# Patient Record
Sex: FEMALE | Race: WHITE | NOT HISPANIC OR LATINO | URBAN - METROPOLITAN AREA
[De-identification: names, ages, dates, MRNs, and addresses within clinical notes are randomized per-mention and may not be internally consistent; named-entity substitution may affect disease eponyms.]

---

## 2019-01-26 ENCOUNTER — HOSPITAL ENCOUNTER (OUTPATIENT)
Dept: URGENT CARE | Facility: CLINIC | Age: 29
Discharge: HOME | End: 2019-01-26

## 2019-01-31 ENCOUNTER — HOSPITAL ENCOUNTER (OUTPATIENT)
Dept: PERIOP | Facility: HOSPITAL | Age: 29
Setting detail: HOSPITAL OUTPATIENT SURGERY
Discharge: HOME | End: 2019-01-31
Attending: OBSTETRICS & GYNECOLOGY

## 2019-01-31 LAB
ABO GROUP BLD: NORMAL
BASOPHILS # BLD AUTO: 0.03 10*3/UL (ref 0–0.2)
BASOPHILS NFR BLD AUTO: 0.8 % (ref 0–3)
BLD GP AB SCN SERPL QL: NORMAL
CONV ABD CONTROL: NORMAL
EOSINOPHIL # BLD AUTO: 0.08 10*3/UL (ref 0–0.7)
EOSINOPHIL # BLD AUTO: 1.94 % (ref 0–7)
ERYTHROCYTE [DISTWIDTH] IN BLOOD BY AUTOMATED COUNT: 16 % (ref 11.5–14.5)
HBA1C MFR BLD: 11.4 G/DL (ref 12–16)
HCG INTACT+B SERPL-ACNC: <0.5 M[IU]/ML (ref 0–5)
HCT VFR BLD AUTO: 33.4 % (ref 37–47)
LYMPHOCYTES # BLD AUTO: 1.16 10*3/UL (ref 1–5)
MCH RBC QN AUTO: 26.3 PG (ref 27–31)
MCHC RBC AUTO-ENTMCNC: 34.1 G/DL (ref 33–37)
MCV RBC AUTO: 77.1 FL (ref 81–99)
MONOCYTES # BLD AUTO: 0.47 10*3/UL (ref 0.2–1.2)
MONOCYTES NFR BLD AUTO: 11.6 % (ref 3–10)
NEUTROPHILS # BLD AUTO: 2.26 10*3/UL (ref 2–8)
NEUTROPHILS NFR BLD AUTO: 56.5 % (ref 30–85)
NRBC BLD AUTO-RTO: 0 % (ref 0–0.01)
PLATELET # BLD AUTO: 267 10*3/UL (ref 130–400)
PMV BLD AUTO: 8.3 FL (ref 7.4–10.4)
RBC # BLD AUTO: 4.33 10*6/UL (ref 4.2–5.4)
RH BLD: NORMAL
VARIANT LYMPHS NFR BLD MANUAL: 29.1 % (ref 20–45)
WBC # BLD AUTO: 4 10*3/UL (ref 4.8–10.8)

## 2025-03-17 ENCOUNTER — TELEPHONE (OUTPATIENT)
Dept: GASTROENTEROLOGY | Facility: CLINIC | Age: 35
End: 2025-03-17
Payer: COMMERCIAL

## 2025-03-17 NOTE — TELEPHONE ENCOUNTER
Spoke with Ann to let her know that we need to know which family member has had colon cancer, because unless it is an immediate family member, the insurance will not cover. She is going to silvestre the provider. Will fax over the records to let us know. Deferring referring for two days to wait on the records.

## 2025-03-21 NOTE — TELEPHONE ENCOUNTER
Spoke with patient regarding a referring we received for a colon screening.Patient stated that her mother/grandmother has had colon cancer. She has schedule a screening appointment on 5/14/25@3:00. I offered her a sooner appointment with another provider. She declined.

## 2025-04-24 ENCOUNTER — OFFICE VISIT (OUTPATIENT)
Dept: OBSTETRICS AND GYNECOLOGY | Age: 35
End: 2025-04-24
Payer: COMMERCIAL

## 2025-04-24 VITALS
HEIGHT: 63 IN | WEIGHT: 138 LBS | BODY MASS INDEX: 24.45 KG/M2 | SYSTOLIC BLOOD PRESSURE: 112 MMHG | HEART RATE: 88 BPM | DIASTOLIC BLOOD PRESSURE: 75 MMHG

## 2025-04-24 DIAGNOSIS — Z01.419 ENCOUNTER FOR GYNECOLOGICAL EXAMINATION WITHOUT ABNORMAL FINDING: Primary | ICD-10-CM

## 2025-04-24 DIAGNOSIS — Z11.3 SCREEN FOR STD (SEXUALLY TRANSMITTED DISEASE): ICD-10-CM

## 2025-04-24 PROCEDURE — 87661 TRICHOMONAS VAGINALIS AMPLIF: CPT | Performed by: OBSTETRICS & GYNECOLOGY

## 2025-04-24 PROCEDURE — G0123 SCREEN CERV/VAG THIN LAYER: HCPCS | Performed by: OBSTETRICS & GYNECOLOGY

## 2025-04-24 PROCEDURE — 87491 CHLMYD TRACH DNA AMP PROBE: CPT | Performed by: OBSTETRICS & GYNECOLOGY

## 2025-04-24 PROCEDURE — 87591 N.GONORRHOEAE DNA AMP PROB: CPT | Performed by: OBSTETRICS & GYNECOLOGY

## 2025-04-24 PROCEDURE — 87624 HPV HI-RISK TYP POOLED RSLT: CPT | Performed by: OBSTETRICS & GYNECOLOGY

## 2025-04-24 RX ORDER — FERROUS SULFATE 325(65) MG
1 TABLET ORAL DAILY
COMMUNITY
Start: 2025-03-12

## 2025-04-24 RX ORDER — ERGOCALCIFEROL 1.25 MG/1
CAPSULE, LIQUID FILLED ORAL
COMMUNITY
Start: 2025-03-11

## 2025-04-24 NOTE — PROGRESS NOTES
"Well Woman Visit    CC: Annual well woman exam       HPI:   34 y.o. Contraception or HRT: Contraception:  Tubal ligation  Menses:   q mon, lasts 5-6 days, changes products q 4hrs on heaviest days.   Pain:  None  Incontinence concerns: No  Hx of abnormal pap:  No  Pt has no complaints today. Desires std swab. Denies sx.       History: PMHx, Meds, Allergies, PSHx, Social Hx, and POBHx all reviewed and updated.      PHYSICAL EXAM:  /75   Pulse 88   Ht 160 cm (63\")   Wt 62.6 kg (138 lb)   LMP 2025   BMI 24.45 kg/m²   General- NAD, alert and oriented, appropriate  Psych- Normal mood, good memory  Neck- No masses, no thyroid enlargement  CV- Regular rhythm, no murnurs  Resp- CTA to bases, no wheezes  Abdomen- Soft, non distended, non tender, no masses    Breast left-  Bilaterally symmetrical, no masses, non tender, no nipple discharge  Breast right- Bilaterally symmetrical, no masses, non tender, no nipple discharge    External genitalia- Normal female, no lesions  Urethra/meatus- Normal, no masses, non tender, no prolapse  Bladder- Normal, no masses, non tender, no prolapse  Vagina- Normal, no atrophy, no lesions, no discharge, no prolapse, on menses  Cvx- Normal, no lesions, no discharge, No cervical motion tenderness  Uterus- Normal size, shape & consistency.  Non tender, mobile.  Adnexa- No mass, non tender  Anus/Rectum/Perineum- Not performed    Lymphatic- No palpable neck, axillary, or groin nodes  Ext- No edema, no cyanosis    Skin- No lesions, no rashes, no acanthosis nigricans        ASSESSMENT and PLAN:  WWE    Diagnoses and all orders for this visit:    1. Encounter for gynecological examination without abnormal finding (Primary)  -     IGP,CtNgTv,Apt HPV    2. Screen for STD (sexually transmitted disease)  -     IGP,CtNgTv,Apt HPV        Counseling:     Track menses, RTO IF <q21d, >7d long, or heavy    Domestic violence/abuse screen: negative    Depression screen: no " SI    Preventative:   BREAST HEALTH- Monthly self breast exam importance and how to reviewed. MMG and/or MRI (prn) reviewed per society guidelines and her individual history. Mammo/MRI screen: Not medically needed.  CERVICAL CANCER Screening- Reviewed current ASCCP guidelines for screening w and wo cotest HPV, age specific.  Screen: Updated today.  COLON CANCER Screening- Reviewed current medical society guidelines and options.  Colonoscopy screen:  Not medically needed.  SEXUAL HEALTH: STD screening desired.  Ordered.  VACCINATIONS Recommended: Flu annually, Gardisil/HPV vaccine 9-27yo, up to 44yo.  Importance discussed, risk being unvaccinated reviewed.  Questions answered  SMOKING STATUS- pt does use nicotine and/or tobacco.  I reviewed importance of avoiding.  Options to quit offered per [employer] protocols.  Recommend FU w PCP regarding quitting options if unable to quit wo assistance.  [facility] : does not qualify.  Gardasil status: declined.       She understands the importance of having any ordered tests to be performed in a timely fashion.  She is encouraged to review her results online and/or contact or office if she has questions.     Follow Up:  Return in about 1 year (around 4/24/2026) for Annual physical.      Danna Mayorga, APRN  04/24/2025

## 2025-04-29 LAB
C TRACH RRNA CVX QL NAA+PROBE: NEGATIVE
CYTOLOGIST CVX/VAG CYTO: NORMAL
CYTOLOGY CVX/VAG DOC CYTO: NORMAL
CYTOLOGY CVX/VAG DOC THIN PREP: NORMAL
DX ICD CODE: NORMAL
HPV I/H RISK 4 DNA CVX QL PROBE+SIG AMP: NEGATIVE
N GONORRHOEA RRNA CVX QL NAA+PROBE: NEGATIVE
OTHER STN SPEC: NORMAL
SERVICE CMNT-IMP: NORMAL
STAT OF ADQ CVX/VAG CYTO-IMP: NORMAL
T VAGINALIS RRNA SPEC QL NAA+PROBE: NEGATIVE

## 2025-04-30 ENCOUNTER — TELEPHONE (OUTPATIENT)
Dept: OBSTETRICS AND GYNECOLOGY | Age: 35
End: 2025-04-30
Payer: COMMERCIAL

## 2025-04-30 NOTE — TELEPHONE ENCOUNTER
Caller: Veronica Patel    Relationship: Self    Best call back number: [phone]      Caller requesting test results: PT     What test was performed: PAP     When was the test performed: 4/24/25     Where was the test performed: OFFICE     Additional notes: PT WOULD LIKE TO HAVE SOMEONE CALL HER TO GO OVER PAP RESULTS

## 2025-04-30 NOTE — TELEPHONE ENCOUNTER
Pt stated the [facility] told her that we will always call on every lab result.  I informed her that we do not call on regular screenings if the result is normal and/or negative and that no news is good news.  I went over the result and pt is aware her pap result came back normal.  Pt voiced understanding.

## 2025-05-14 ENCOUNTER — TELEPHONE (OUTPATIENT)
Dept: GASTROENTEROLOGY | Facility: CLINIC | Age: 35
End: 2025-05-14
Payer: COMMERCIAL

## 2025-05-14 NOTE — TELEPHONE ENCOUNTER
During the Direct Access call with Veronica Patel, 1990,  the patient communicated that they are having other gastrointestinal symptoms, to include numbness/pain in intestinal area that will require an office visit. The patient verbalized understanding, the call was stopped and the patient was scheduled for an office visit with ADRIANNE Ignacio on 05/19/2025 @ 2 pm.

## 2025-05-19 ENCOUNTER — OFFICE VISIT (OUTPATIENT)
Dept: GASTROENTEROLOGY | Facility: CLINIC | Age: 35
End: 2025-05-19
Payer: COMMERCIAL

## 2025-05-19 VITALS
HEIGHT: 63 IN | SYSTOLIC BLOOD PRESSURE: 130 MMHG | HEART RATE: 87 BPM | DIASTOLIC BLOOD PRESSURE: 84 MMHG | BODY MASS INDEX: 24.63 KG/M2 | WEIGHT: 139 LBS

## 2025-05-19 DIAGNOSIS — Z80.0 FAMILY HISTORY OF COLON CANCER IN MOTHER: ICD-10-CM

## 2025-05-19 DIAGNOSIS — R14.0 ABDOMINAL BLOATING: Primary | ICD-10-CM

## 2025-05-19 DIAGNOSIS — R20.0 NUMBNESS: ICD-10-CM

## 2025-05-19 RX ORDER — SODIUM, POTASSIUM,MAG SULFATES 17.5-3.13G
2 SOLUTION, RECONSTITUTED, ORAL ORAL ONCE
Qty: 354 ML | Refills: 0 | Status: SHIPPED | OUTPATIENT
Start: 2025-05-19 | End: 2025-05-19

## 2025-05-19 NOTE — PROGRESS NOTES
Chief Complaint     Abdominal Pain and FHx Colon Cancer    Patient or patient representative verbalized consent for the use of Ambient Listening during the visit with  ADRIANNE Jules for chart documentation. 5/19/2025  14:30 EDT    History of Present Illness     History of Present Illness  The patient presents for evaluation of abdominal pain and a family history of colon cancer.    She underwent liposuction in her abdominal region 2 years ago.  Recently, she has been experiencing fluid retention and bloating, particularly when wearing tight clothing around her waist. She also reports a new sensation of numbness in the left lower quadrant of her abdomen since 01/2025, which intensifies during bowel movements. This numbness is reminiscent of the post-liposuction numbness she experienced throughout her abdomen. Her bowel movements are regular, occurring every other day, with no reported presence of blood in her stool. She has never undergone a colonoscopy. She also mentions that she experiences diarrhea if she skips any of her three daily meals.    She experienced an unusual chest pain in 01/2025, described as rib-like rather than cardiac in nature, which radiated to the center of her chest. This pain persisted for approximately 2.5 to 3 months before resolving. She does not report any symptoms of heartburn, reflux, nausea, or vomiting.    PAST SURGICAL HISTORY:  Liposuction (abdominal region) - 05/2023    FAMILY HISTORY  - Mother: Colon cancer, diagnosed at age 49, still living  - Grandfather: Colon cancer, age of diagnosis unknown         History      History reviewed. No pertinent past medical history.    Past Surgical History:   Procedure Laterality Date    TUBAL ABDOMINAL LIGATION         Family History   Problem Relation Age of Onset    Colon cancer Mother     Colon cancer Maternal Grandfather     Breast cancer Neg Hx     Ovarian cancer Neg Hx     Uterine cancer Neg Hx     Melanoma Neg Hx      "    Current Medications        Current Outpatient Medications:     FeroSul 325 (65 Fe) MG tablet, Take 1 tablet by mouth Daily., Disp: , Rfl:     vitamin D (ERGOCALCIFEROL) 1.25 MG (78518 UT) capsule capsule, , Disp: , Rfl:     sodium-potassium-magnesium sulfates (SUPREP) 17.5-3.13-1.6 GM/177ML solution oral solution, Take 2 bottles by mouth 1 (One) Time for 1 dose. Take as directed, Disp: 354 mL, Rfl: 0     Allergies     Allergies   Allergen Reactions    Penicillins Unknown - High Severity       Social History       Social History     Social History Narrative    Not on file       Immunizations     Immunization:  Immunization History   Administered Date(s) Administered    COVID-19 ([employer]) 11/18/2021          Objective     Objective     Vital Signs:   /84 (BP Location: Left arm, Patient Position: Sitting, Cuff Size: Adult)   Pulse 87   Ht 160 cm (62.99\")   Wt 63 kg (139 lb)   BMI 24.63 kg/m²       Physical Exam    Results      Result Review :               Results             Assessment and Plan        Assessment and Plan    Diagnoses and all orders for this visit:    1. Abdominal bloating (Primary)  -     Case Request; Standing  -     Case Request    2. Numbness  Comments:  abdomen LLQ  Orders:  -     Case Request; Standing  -     Case Request    3. Family history of colon cancer in mother  -     Case Request; Standing  -     Case Request    Other orders  -     Follow Anesthesia Guidelines / Protocol; Standing  -     Follow Anesthesia Guidelines / Protocol; Future  -     sodium-potassium-magnesium sulfates (SUPREP) 17.5-3.13-1.6 GM/177ML solution oral solution; Take 2 bottles by mouth 1 (One) Time for 1 dose. Take as directed  Dispense: 354 mL; Refill: 0  -     Verify NPO; Standing  -     Verify Bowel Prep Was Successful; Standing  -     Give Tap Water Enema If Bowel Prep Insufficient; Standing  -     Obtain Informed Consent; Standing        Assessment & Plan  1. Abdominal pain:  - Sensation of numbness " in the left lower quadrant of the abdomen, worsening with bowel movements.  - Numbness present since 01/2025, not associated with pain.  - Possible nerve damage related to liposuction performed 2 years ago.  - Schedule colonoscopy to rule out underlying issues.  - Consider CT scan if colonoscopy results are unremarkable and numbness persists.    2. Family history of colon cancer:  - Mother diagnosed with colon cancer at age 49.  - Grandfather also had colon cancer.  - Schedule colonoscopy for screening.  - Provide instructions on preparation: clear liquid diet and medication to cleanse the colon the day before the procedure.      COLONOSCOPY (N/A)Surgical Risk and Benefits discussed: Possible risks/complications, benefits, and alternatives to surgical or invasive procedure have been explained to patient and/or legal guardian; risks include bleeding, infection, and perforation. Patient has been evaluated and can tolerate anesthesia and/or sedation. Risks, benefits, and alternatives to anesthesia and sedation have been explained to patient and/or legal guardian.       Follow Up        Follow Up   Return in about 7 months (around 12/19/2025) for abdominal bloating .  Patient was given instructions and counseling regarding her condition or for health maintenance advice. Please see specific information pulled into the AVS if appropriate.

## 2025-05-20 ENCOUNTER — TELEPHONE (OUTPATIENT)
Dept: GASTROENTEROLOGY | Facility: CLINIC | Age: 35
End: 2025-05-20
Payer: COMMERCIAL

## 2025-05-20 NOTE — TELEPHONE ENCOUNTER
ENDO RECONCILIATION  Verify source of procedure(s): Office visit  If other, please list source: 5/19/25    TIME OUT-CONFIRM CORRECT PROCEDURE: Colonoscopy  Cardiology:  Pulmonology:  Blood thinner:   GLP-1:  Additional DX/indication for procedure:     Please include any other notes relevant to endo reconciliation: N/A

## 2025-05-21 ENCOUNTER — ANESTHESIA EVENT (OUTPATIENT)
Dept: GASTROENTEROLOGY | Facility: HOSPITAL | Age: 35
End: 2025-05-21
Payer: COMMERCIAL

## 2025-05-21 RX ORDER — SODIUM CHLORIDE, SODIUM LACTATE, POTASSIUM CHLORIDE, CALCIUM CHLORIDE 600; 310; 30; 20 MG/100ML; MG/100ML; MG/100ML; MG/100ML
30 INJECTION, SOLUTION INTRAVENOUS CONTINUOUS
Status: CANCELLED | OUTPATIENT
Start: 2025-05-21 | End: 2025-05-22

## 2025-05-21 NOTE — ANESTHESIA PREPROCEDURE EVALUATION
Anesthesia Evaluation     Patient summary reviewed and Nursing notes reviewed   NPO Solid Status: > 8 hours  NPO Liquid Status: > 4 hours           Airway   Mallampati: I  TM distance: >3 FB  No difficulty expected  Dental - normal exam     Pulmonary - normal exam   (+) a smoker vape,  Cardiovascular - negative cardio ROS and normal exam  Exercise tolerance: good (4-7 METS)        Neuro/Psych  (+) numbness  GI/Hepatic/Renal/Endo - negative ROS     Musculoskeletal (-) negative ROS    Abdominal  - normal exam   Substance History - negative use     OB/GYN          Other        ROS/Med Hx Other: S/p tubal       Phys Exam Other: Tongue ring removed.               Anesthesia Plan    ASA 2     general   total IV anesthesia  (Total IV Anesthesia    Patient understands anesthesia not responsible for dental damage.  )  intravenous induction     Anesthetic plan, risks, benefits, and alternatives have been provided, discussed and informed consent has been obtained with: patient.    Plan discussed with CRNA.    CODE STATUS:

## 2025-05-22 ENCOUNTER — TELEPHONE (OUTPATIENT)
Dept: GASTROENTEROLOGY | Facility: CLINIC | Age: 35
End: 2025-05-22
Payer: COMMERCIAL

## 2025-05-22 ENCOUNTER — ANESTHESIA (OUTPATIENT)
Dept: GASTROENTEROLOGY | Facility: HOSPITAL | Age: 35
End: 2025-05-22
Payer: COMMERCIAL

## 2025-05-22 ENCOUNTER — HOSPITAL ENCOUNTER (OUTPATIENT)
Facility: HOSPITAL | Age: 35
Setting detail: HOSPITAL OUTPATIENT SURGERY
Discharge: HOME | End: 2025-05-22
Attending: INTERNAL MEDICINE | Admitting: INTERNAL MEDICINE
Payer: COMMERCIAL

## 2025-05-22 VITALS
HEART RATE: 69 BPM | BODY MASS INDEX: 24.06 KG/M2 | OXYGEN SATURATION: 99 % | SYSTOLIC BLOOD PRESSURE: 110 MMHG | RESPIRATION RATE: 19 BRPM | DIASTOLIC BLOOD PRESSURE: 74 MMHG | WEIGHT: 135.8 LBS | TEMPERATURE: 97.3 F

## 2025-05-22 PROCEDURE — 45378 DIAGNOSTIC COLONOSCOPY: CPT | Performed by: INTERNAL MEDICINE

## 2025-05-22 PROCEDURE — 25010000002 PROPOFOL 10 MG/ML EMULSION: Performed by: NURSE ANESTHETIST, CERTIFIED REGISTERED

## 2025-05-22 PROCEDURE — 25010000002 PHENYLEPHRINE HCL-NACL 1000-0.9 MCG/10ML-% SOLUTION PREFILLED SYRINGE: Performed by: NURSE ANESTHETIST, CERTIFIED REGISTERED

## 2025-05-22 PROCEDURE — 25810000003 LACTATED RINGERS PER 1000 ML: Performed by: NURSE ANESTHETIST, CERTIFIED REGISTERED

## 2025-05-22 RX ORDER — EPHEDRINE SULFATE 50 MG/ML
INJECTION, SOLUTION INTRAVENOUS AS NEEDED
Status: DISCONTINUED | OUTPATIENT
Start: 2025-05-22 | End: 2025-05-22 | Stop reason: SURG

## 2025-05-22 RX ORDER — PHENYLEPHRINE HCL IN 0.9% NACL 1 MG/10 ML
SYRINGE (ML) INTRAVENOUS AS NEEDED
Status: DISCONTINUED | OUTPATIENT
Start: 2025-05-22 | End: 2025-05-22 | Stop reason: SURG

## 2025-05-22 RX ORDER — PROPOFOL 10 MG/ML
VIAL (ML) INTRAVENOUS AS NEEDED
Status: DISCONTINUED | OUTPATIENT
Start: 2025-05-22 | End: 2025-05-22 | Stop reason: SURG

## 2025-05-22 RX ORDER — SODIUM CHLORIDE, SODIUM LACTATE, POTASSIUM CHLORIDE, CALCIUM CHLORIDE 600; 310; 30; 20 MG/100ML; MG/100ML; MG/100ML; MG/100ML
INJECTION, SOLUTION INTRAVENOUS CONTINUOUS PRN
Status: DISCONTINUED | OUTPATIENT
Start: 2025-05-22 | End: 2025-05-22 | Stop reason: SURG

## 2025-05-22 RX ADMIN — EPHEDRINE SULFATE 10 MG: 50 INJECTION INTRAVENOUS at 10:40

## 2025-05-22 RX ADMIN — SODIUM CHLORIDE, POTASSIUM CHLORIDE, SODIUM LACTATE AND CALCIUM CHLORIDE: 600; 310; 30; 20 INJECTION, SOLUTION INTRAVENOUS at 10:24

## 2025-05-22 RX ADMIN — PROPOFOL 150 MCG/KG/MIN: 10 INJECTION, EMULSION INTRAVENOUS at 10:37

## 2025-05-22 RX ADMIN — Medication 100 MCG: at 10:39

## 2025-05-22 RX ADMIN — PROPOFOL 130 MG: 10 INJECTION, EMULSION INTRAVENOUS at 10:36

## 2025-05-22 NOTE — H&P
Pre Procedure History & Physical    Chief Complaint:   LLQ pain, bloating    Subjective     HPI:   35 yo F here for eval of LLQ pain, bloating.    Past Medical History:   History reviewed. No pertinent past medical history.    Past Surgical History:  Past Surgical History:   Procedure Laterality Date    TUBAL ABDOMINAL LIGATION         Family History:  Family History   Problem Relation Age of Onset    Colon cancer Mother     Colon cancer Maternal Grandfather     Breast cancer Neg Hx     Ovarian cancer Neg Hx     Uterine cancer Neg Hx     Melanoma Neg Hx        Social History:   reports that she has quit smoking. Her smoking use included cigarettes. She has been exposed to tobacco smoke. She has never used smokeless tobacco. She reports that she does not currently use alcohol. She reports that she does not currently use drugs.    Medications:   Medications Prior to Admission   Medication Sig Dispense Refill Last Dose/Taking    FeroSul 325 (65 Fe) MG tablet Take 1 tablet by mouth Daily.       vitamin D (ERGOCALCIFEROL) 1.25 MG (96656 UT) capsule capsule           Allergies:  Penicillins    ROS:    Pertinent items are noted in HPI     Objective     Blood pressure 116/77, pulse 64, temperature 97.7 °F (36.5 °C), temperature source Temporal, resp. rate 15, weight 61.6 kg (135 lb 12.9 oz), last menstrual period 04/20/2025, SpO2 100%.    Physical Exam   Constitutional: Pt is oriented to person, place, and time and well-developed, well-nourished, and in no distress.   Mouth/Throat: Oropharynx is clear and moist.   Neck: Normal range of motion.   Cardiovascular: Normal rate, regular rhythm and normal heart sounds.    Pulmonary/Chest: Effort normal and breath sounds normal.   Abdominal: Soft. Nontender  Skin: Skin is warm and dry.   Psychiatric: Mood, memory, affect and judgment normal.     Assessment & Plan     Diagnosis:  LLQ pain, bloating    Anticipated Surgical Procedure:  Colonoscopy    The risks, benefits, and  alternatives of this procedure have been discussed with the patient or the responsible party- the patient understands and agrees to proceed.

## 2025-05-22 NOTE — LETTER
May 22, 2025    Veronica Patel  [address] [place] [address]      5/22/2025         Veronica Patel   [address] [place] [address]            Dear Veronica Yepez MD performed a(n) Colonoscopy on May 22, 2025; Your biopsy results were benign.  If applicable, please refer to the pathology and/or procedure report for more detailed information via MyChart or by obtaining a copy of the reports from our office. We recommend that you have a repeat Colonoscopy in 5 years.    A colonoscopy is the best way to screen for colon polyps and colon cancer, however despite careful evaluation, small polyps can sometimes be missed. If you should develop any bleeding, abdominal pain, weight loss, change in bowel habits, or any other GI complications, please inform our office as soon as possible.      Additionally, Estela Yepez MD recommends you adopt the following healthy habits to lower your risk of future polyps and colon cancer:    Exercise regularly.  Do not smoke or consume alcohol.  Limit red meat intake.  Include ample fruits and vegetables in your diet.    If you have any questions regarding your procedure or results, please do not hesitate to contact our office.    Sincerely,        [facility]

## 2025-05-22 NOTE — ANESTHESIA POSTPROCEDURE EVALUATION
Patient: Veronica Patel    Procedure Summary       Date: 05/22/25 Room / Location: [facility] ENDOSCOPY 3 / [facility] ENDOSCOPY    Anesthesia Start: 1033 Anesthesia Stop: 1054    Procedure: COLONOSCOPY Diagnosis:       Abdominal bloating      Numbness      Family history of colon cancer in mother      (Abdominal bloating [R14.0])      (Numbness [R20.0])      (Family history of colon cancer in mother [Z80.0])    Surgeons: Estela Yepez MD Provider: Estela Guidry CRNA    Anesthesia Type: general ASA Status: 2            Anesthesia Type: general    Vitals  Vitals Value Taken Time   /74 05/22/25 11:09   Temp 36.3 °C (97.3 °F) 05/22/25 11:09   Pulse 69 05/22/25 11:09   Resp 19 05/22/25 11:09   SpO2 99 % 05/22/25 11:09           Post Anesthesia Care and Evaluation    Patient location during evaluation: PHASE II  Patient participation: complete - patient participated  Level of consciousness: awake and awake and alert  Pain management: satisfactory to patient    Airway patency: patent  Anesthetic complications: No anesthetic complications  PONV Status: none  Cardiovascular status: acceptable and hemodynamically stable  Respiratory status: acceptable, nonlabored ventilation and spontaneous ventilation  Hydration status: acceptable    Comments: Patient sitting upright in bed participating in care, no distress noted, nonlabored respirations with hemodynamically stable v/s, ok for discharge home      No anesthesia care post op

## 2025-05-30 ENCOUNTER — PATIENT ROUNDING (BHMG ONLY) (OUTPATIENT)
Dept: GASTROENTEROLOGY | Facility: CLINIC | Age: 35
End: 2025-05-30
Payer: COMMERCIAL

## 2025-05-30 NOTE — PROGRESS NOTES
"5/30/2025      Hello, may I speak with Veronica Patel     My name is Diane. I am calling from [facility]. I show that you had a recent visit with ADRIANNE Ignacio.    Before we get started may I verify your date of birth? 1990    I am calling to officially welcome you to our practice and ask about your recent visit. Is this a good time to talk?   Yes      Tell me about your visit with us. What things went well?  \"Everything went fine\"    We strive to ensure that we protect your safety and privacy. Is there anything we could have done to improve this during your visit?    No    We're always looking for ways to make our patients' experiences even better. Do you have recommendations on ways we may improve?   No     Overall were you satisfied with your first visit to our practice?  Yes     I appreciate you taking the time to speak with me today. Is there anything else I can do for you?   No    I am glad to hear that you had a very good visit and I appreciate you taking the time to provide feedback on this call. We would greatly appreciate you filling out a survey if you receive one in the mail, email or text. This is a great opportunity to provide any additional feedback that you may think of after this call as well.       Thank you, and have a great day.   "

## (undated) DEVICE — DEFENDO AIR WATER SUCTION AND BIOPSY VALVE KIT FOR  OLYMPUS: Brand: DEFENDO AIR/WATER/SUCTION AND BIOPSY VALVE

## (undated) DEVICE — SOLIDIFIER LIQLOC PLS 1500CC BT

## (undated) DEVICE — Device

## (undated) DEVICE — CONN JET HYDRA H20 AUXILIARY DISP

## (undated) DEVICE — THE STERILE LIGHT HANDLE COVER IS USED WITH STERIS SURGICAL LIGHTING AND VISUALIZATION SYSTEMS.

## (undated) DEVICE — SOL IRRG H2O PL/BG 1000ML STRL

## (undated) DEVICE — LINER SURG CANSTR SXN S/RIGD 1500CC